# Patient Record
Sex: FEMALE | Race: WHITE
[De-identification: names, ages, dates, MRNs, and addresses within clinical notes are randomized per-mention and may not be internally consistent; named-entity substitution may affect disease eponyms.]

---

## 2020-03-17 ENCOUNTER — HOSPITAL ENCOUNTER (EMERGENCY)
Dept: HOSPITAL 53 - M ED | Age: 40
Discharge: HOME | End: 2020-03-17
Payer: COMMERCIAL

## 2020-03-17 VITALS — HEIGHT: 61 IN | WEIGHT: 156.53 LBS | BODY MASS INDEX: 29.55 KG/M2

## 2020-03-17 VITALS — SYSTOLIC BLOOD PRESSURE: 125 MMHG | DIASTOLIC BLOOD PRESSURE: 83 MMHG

## 2020-03-17 DIAGNOSIS — R10.9: Primary | ICD-10-CM

## 2020-03-17 DIAGNOSIS — Z87.442: ICD-10-CM

## 2020-03-17 DIAGNOSIS — Z88.8: ICD-10-CM

## 2020-03-17 DIAGNOSIS — Z88.5: ICD-10-CM

## 2020-03-17 DIAGNOSIS — Z88.0: ICD-10-CM

## 2020-03-17 LAB
ALBUMIN SERPL BCG-MCNC: 3.7 GM/DL (ref 3.2–5.2)
ALT SERPL W P-5'-P-CCNC: 17 U/L (ref 12–78)
BASOPHILS # BLD AUTO: 0.1 10^3/UL (ref 0–0.2)
BASOPHILS NFR BLD AUTO: 0.7 % (ref 0–1)
BILIRUB CONJ SERPL-MCNC: 0.2 MG/DL (ref 0–0.2)
BILIRUB SERPL-MCNC: 0.6 MG/DL (ref 0.2–1)
EOSINOPHIL # BLD AUTO: 0 10^3/UL (ref 0–0.5)
EOSINOPHIL NFR BLD AUTO: 0.2 % (ref 0–3)
HCT VFR BLD AUTO: 40.8 % (ref 36–47)
HGB BLD-MCNC: 14.1 G/DL (ref 12–15.5)
LIPASE SERPL-CCNC: 76 U/L (ref 73–393)
LYMPHOCYTES # BLD AUTO: 2.4 10^3/UL (ref 1.5–5)
LYMPHOCYTES NFR BLD AUTO: 27.3 % (ref 24–44)
MCH RBC QN AUTO: 30.9 PG (ref 27–33)
MCHC RBC AUTO-ENTMCNC: 34.6 G/DL (ref 32–36.5)
MCV RBC AUTO: 89.3 FL (ref 80–96)
MONOCYTES # BLD AUTO: 0.4 10^3/UL (ref 0–0.8)
MONOCYTES NFR BLD AUTO: 4.3 % (ref 0–5)
NEUTROPHILS # BLD AUTO: 5.9 10^3/UL (ref 1.5–8.5)
NEUTROPHILS NFR BLD AUTO: 66.9 % (ref 36–66)
PLATELET # BLD AUTO: 353 10^3/UL (ref 150–450)
PROT SERPL-MCNC: 7.6 GM/DL (ref 6.4–8.2)
RBC # BLD AUTO: 4.57 10^6/UL (ref 4–5.4)
WBC # BLD AUTO: 8.8 10^3/UL (ref 4–10)

## 2020-03-17 NOTE — REP
CT ABDOMEN AND PELVIS WITHOUT CONTRAST:

 

CT abdomen and pelvis performed without oral or IV contrast.  Sagittal and

coronal reconstruction images are performed.

 

Visualized lung bases demonstrate mild interstitial fibrotic change with a

calcified granuloma seen in each lung base.

 

The liver is grossly unremarkable.  The patient has had a prior cholecystectomy.

The spleen, adrenals, pancreas, and kidneys are unremarkable.  There is no renal,

ureteral or bladder calculus.  There is no hydroureteronephrosis.  There is no

abdominal aortic aneurysm.  There is no adenopathy.  I see no free air or free

fluid.  There is no bowel wall thickening.  There is no evidence of appendicitis.

No pelvic mass is seen.  Urinary bladder is not well distended and not well

evaluated.  Patient has had a prior hysterectomy.

 

IMPRESSION:

 

No acute abnormality is detected.  No renal or ureteral calculus and no

hydroureteronephrosis.  No evidence of appendicitis.

 

 

Electronically Signed by

Chivo Tirado MD 03/18/2020 09:12 A

## 2020-05-14 ENCOUNTER — HOSPITAL ENCOUNTER (OUTPATIENT)
Dept: HOSPITAL 53 - M SMT | Age: 40
End: 2020-05-14
Attending: NURSE PRACTITIONER
Payer: COMMERCIAL

## 2020-05-14 DIAGNOSIS — R10.9: Primary | ICD-10-CM

## 2020-05-14 LAB
AMORPH SED URNS QL MICRO: (no result)
APPEARANCE UR: (no result)
BACTERIA UR QL AUTO: NEGATIVE
BILIRUB UR QL STRIP.AUTO: NEGATIVE
GLUCOSE UR QL STRIP.AUTO: NEGATIVE MG/DL
HGB UR QL STRIP.AUTO: NEGATIVE
KETONES UR QL STRIP.AUTO: NEGATIVE MG/DL
LEUKOCYTE ESTERASE UR QL STRIP.AUTO: NEGATIVE
NITRITE UR QL STRIP.AUTO: NEGATIVE
PH UR STRIP.AUTO: 9 UNITS (ref 5–9)
PROT UR QL STRIP.AUTO: (no result) MG/DL
RBC # UR AUTO: 8 /HPF (ref 0–3)
SP GR UR STRIP.AUTO: 1.02 (ref 1–1.03)
SQUAMOUS #/AREA URNS AUTO: 2 /HPF (ref 0–6)
UROBILINOGEN UR QL STRIP.AUTO: 0.2 MG/DL (ref 0–2)
WBC #/AREA URNS AUTO: 42 /HPF (ref 0–3)